# Patient Record
Sex: MALE | Race: OTHER | HISPANIC OR LATINO | URBAN - METROPOLITAN AREA
[De-identification: names, ages, dates, MRNs, and addresses within clinical notes are randomized per-mention and may not be internally consistent; named-entity substitution may affect disease eponyms.]

---

## 2018-11-16 ENCOUNTER — EMERGENCY (EMERGENCY)
Facility: HOSPITAL | Age: 53
LOS: 1 days | Discharge: HOME | End: 2018-11-16
Attending: EMERGENCY MEDICINE
Payer: COMMERCIAL

## 2018-11-16 VITALS
RESPIRATION RATE: 20 BRPM | OXYGEN SATURATION: 98 % | WEIGHT: 171.08 LBS | DIASTOLIC BLOOD PRESSURE: 84 MMHG | HEIGHT: 70 IN | SYSTOLIC BLOOD PRESSURE: 126 MMHG | TEMPERATURE: 99 F | HEART RATE: 86 BPM

## 2018-11-16 LAB
ALBUMIN SERPL ELPH-MCNC: 4.3 G/DL — SIGNIFICANT CHANGE UP (ref 3.3–5.2)
ALP SERPL-CCNC: 75 U/L — SIGNIFICANT CHANGE UP (ref 40–120)
ALT FLD-CCNC: 29 U/L — SIGNIFICANT CHANGE UP
ANION GAP SERPL CALC-SCNC: 13 MMOL/L — SIGNIFICANT CHANGE UP (ref 5–17)
APTT BLD: 31.2 SEC — SIGNIFICANT CHANGE UP (ref 27.5–36.3)
AST SERPL-CCNC: 22 U/L — SIGNIFICANT CHANGE UP
BASOPHILS # BLD AUTO: 0 K/UL — SIGNIFICANT CHANGE UP (ref 0–0.2)
BASOPHILS NFR BLD AUTO: 0.2 % — SIGNIFICANT CHANGE UP (ref 0–2)
BILIRUB SERPL-MCNC: 0.3 MG/DL — LOW (ref 0.4–2)
BUN SERPL-MCNC: 12 MG/DL — SIGNIFICANT CHANGE UP (ref 8–20)
CALCIUM SERPL-MCNC: 9.2 MG/DL — SIGNIFICANT CHANGE UP (ref 8.6–10.2)
CHLORIDE SERPL-SCNC: 107 MMOL/L — SIGNIFICANT CHANGE UP (ref 98–107)
CK MB CFR SERPL CALC: 3 NG/ML — SIGNIFICANT CHANGE UP (ref 0–6.7)
CK SERPL-CCNC: 210 U/L — HIGH (ref 30–200)
CO2 SERPL-SCNC: 25 MMOL/L — SIGNIFICANT CHANGE UP (ref 22–29)
CREAT SERPL-MCNC: 1.13 MG/DL — SIGNIFICANT CHANGE UP (ref 0.5–1.3)
EOSINOPHIL # BLD AUTO: 0.1 K/UL — SIGNIFICANT CHANGE UP (ref 0–0.5)
EOSINOPHIL NFR BLD AUTO: 1.5 % — SIGNIFICANT CHANGE UP (ref 0–5)
GLUCOSE SERPL-MCNC: 87 MG/DL — SIGNIFICANT CHANGE UP (ref 70–115)
HCT VFR BLD CALC: 46.1 % — SIGNIFICANT CHANGE UP (ref 42–52)
HGB BLD-MCNC: 16 G/DL — SIGNIFICANT CHANGE UP (ref 14–18)
INR BLD: 0.95 RATIO — SIGNIFICANT CHANGE UP (ref 0.88–1.16)
LYMPHOCYTES # BLD AUTO: 3.3 K/UL — SIGNIFICANT CHANGE UP (ref 1–4.8)
LYMPHOCYTES # BLD AUTO: 37.6 % — SIGNIFICANT CHANGE UP (ref 20–55)
MCHC RBC-ENTMCNC: 30.5 PG — SIGNIFICANT CHANGE UP (ref 27–31)
MCHC RBC-ENTMCNC: 34.7 G/DL — SIGNIFICANT CHANGE UP (ref 32–36)
MCV RBC AUTO: 87.8 FL — SIGNIFICANT CHANGE UP (ref 80–94)
MONOCYTES # BLD AUTO: 0.6 K/UL — SIGNIFICANT CHANGE UP (ref 0–0.8)
MONOCYTES NFR BLD AUTO: 7.3 % — SIGNIFICANT CHANGE UP (ref 3–10)
NEUTROPHILS # BLD AUTO: 4.6 K/UL — SIGNIFICANT CHANGE UP (ref 1.8–8)
NEUTROPHILS NFR BLD AUTO: 53.2 % — SIGNIFICANT CHANGE UP (ref 37–73)
NT-PROBNP SERPL-SCNC: <5 PG/ML — SIGNIFICANT CHANGE UP (ref 0–300)
PLATELET # BLD AUTO: 146 K/UL — LOW (ref 150–400)
POTASSIUM SERPL-MCNC: 4.4 MMOL/L — SIGNIFICANT CHANGE UP (ref 3.5–5.3)
POTASSIUM SERPL-SCNC: 4.4 MMOL/L — SIGNIFICANT CHANGE UP (ref 3.5–5.3)
PROT SERPL-MCNC: 7.1 G/DL — SIGNIFICANT CHANGE UP (ref 6.6–8.7)
PROTHROM AB SERPL-ACNC: 10.9 SEC — SIGNIFICANT CHANGE UP (ref 10–12.9)
RBC # BLD: 5.25 M/UL — SIGNIFICANT CHANGE UP (ref 4.6–6.2)
RBC # FLD: 13.3 % — SIGNIFICANT CHANGE UP (ref 11–15.6)
SODIUM SERPL-SCNC: 145 MMOL/L — SIGNIFICANT CHANGE UP (ref 135–145)
TROPONIN T SERPL-MCNC: <0.01 NG/ML — SIGNIFICANT CHANGE UP (ref 0–0.06)
WBC # BLD: 8.7 K/UL — SIGNIFICANT CHANGE UP (ref 4.8–10.8)
WBC # FLD AUTO: 8.7 K/UL — SIGNIFICANT CHANGE UP (ref 4.8–10.8)

## 2018-11-16 PROCEDURE — 71045 X-RAY EXAM CHEST 1 VIEW: CPT | Mod: 26

## 2018-11-16 PROCEDURE — 93010 ELECTROCARDIOGRAM REPORT: CPT

## 2018-11-16 PROCEDURE — 99285 EMERGENCY DEPT VISIT HI MDM: CPT

## 2018-11-16 NOTE — ED PROVIDER NOTE - OBJECTIVE STATEMENT
52 y/o M presents to ED c/o intermittent R sided chest pain described as sharp and pressure like onset over 1 week ago. Associated dyspnea on exertion. Last episode of pain was this afternoon and lasted 2-3 hours but resolved. Currently in the ED he is asymptomatic. Took an Aspirin earlier today, but does not take any prescribed medications daily. Denies fevers, chills, cough, abdominal pain, nausea, vomiting, drug use, smoking or EtOH use. No further acute complaints at this time. 54 y/o M presents to ED c/o intermittent R sided chest pain described as sharp and pressure like onset over 1 week ago. Associated dyspnea on exertion. Last episode of pain was this afternoon and lasted 2-3 hours but resolved. Currently in the ED he is asymptomatic. Took an Aspirin earlier today, but does not take any prescribed medications daily. Denies fevers, chills, cough, back pain, diaphoresis, abdominal pain, nausea, vomiting, drug use, smoking or EtOH use. No further acute complaints at this time. 52 y/o M presents to ED c/o intermittent R sided chest pain described as sharp and pressure like onset over 1 week ago. Associated dyspnea on exertion. Last episode of pain was this afternoon and lasted 2-3 hours but resolved. Currently in the ED he is asymptomatic. Took an Aspirin earlier today, but does not take any prescribed medications daily. Denies fevers, chills, cough, back pain, diaphoresis, abdominal pain, nausea, vomiting, drug use, smoking or EtOH use. No further acute complaints at this time.    PMD: Dr. Miri Serrano

## 2018-11-16 NOTE — ED STATDOCS - PROGRESS NOTE DETAILS
54 y/o M pt with hx of throat surgery as child presents to ED c/o right sided chest pain and SOB. Pain is describes as sharp pressure. Pt also has decreased exercise tolerance. He is not on any medications. Denies smoking.   Protocol orders have been entered following a focused evaluation in intake. Pt to be placed in the main ED for further evaluation by another provider.

## 2018-11-16 NOTE — ED PROVIDER NOTE - MEDICAL DECISION MAKING DETAILS
Will check labs, EKG, d-dimer and re-evaluate. Will check labs, EKG and d-dimer, obtain cardiology consult and re-evaluate.

## 2018-11-16 NOTE — ED ADULT NURSE NOTE - NSIMPLEMENTINTERV_GEN_ALL_ED
Implemented All Universal Safety Interventions:  [place] to call system. Call bell, personal items and telephone within reach. Instruct patient to call for assistance. Room bathroom lighting operational. Non-slip footwear when patient is off stretcher. Physically safe environment: no spills, clutter or unnecessary equipment. Stretcher in lowest position, wheels locked, appropriate side rails in place.

## 2018-11-16 NOTE — ED ADULT NURSE NOTE - CHPI ED NUR SYMPTOMS NEG
no headache/no cough/no hemoptysis/no chills/no diaphoresis/no body aches/no wheezing/no edema/no fever

## 2018-11-16 NOTE — ED ADULT NURSE NOTE - OBJECTIVE STATEMENT
patient states for past 2 weeks has been having mild SOB when ambulating with on and off right sided chest pain, denies N/V/cough, patient resting comfortably

## 2018-11-16 NOTE — ED PROVIDER NOTE - NS_ ATTENDINGSCRIBEDETAILS _ED_A_ED_FT
I, Arie Fox, performed the initial face to face bedside interview with this patient regarding history of present illness, review of symptoms and relevant past medical, social and family history.  I completed an independent physical examination.    The history, relevant review of systems, past medical and surgical history, medical decision making, and physical examination was documented by the scribe in my presence and I attest to the accuracy of the documentation.

## 2018-11-16 NOTE — ED ADULT TRIAGE NOTE - WEIGHT IN LBS
171 Consent: Written consent was obtained and risks were reviewed including but not limited to scarring, infection, bleeding, scabbing, incomplete removal, nerve damage and allergy to anesthesia.

## 2018-11-17 VITALS
RESPIRATION RATE: 20 BRPM | TEMPERATURE: 98 F | SYSTOLIC BLOOD PRESSURE: 133 MMHG | HEART RATE: 85 BPM | OXYGEN SATURATION: 98 % | DIASTOLIC BLOOD PRESSURE: 82 MMHG

## 2018-11-17 LAB — TROPONIN T SERPL-MCNC: <0.01 NG/ML — SIGNIFICANT CHANGE UP (ref 0–0.06)

## 2018-11-17 PROCEDURE — 82553 CREATINE MB FRACTION: CPT

## 2018-11-17 PROCEDURE — 82550 ASSAY OF CK (CPK): CPT

## 2018-11-17 PROCEDURE — 93005 ELECTROCARDIOGRAM TRACING: CPT

## 2018-11-17 PROCEDURE — 84484 ASSAY OF TROPONIN QUANT: CPT

## 2018-11-17 PROCEDURE — 83880 ASSAY OF NATRIURETIC PEPTIDE: CPT

## 2018-11-17 PROCEDURE — 36415 COLL VENOUS BLD VENIPUNCTURE: CPT

## 2018-11-17 PROCEDURE — 85027 COMPLETE CBC AUTOMATED: CPT

## 2018-11-17 PROCEDURE — T1013: CPT

## 2018-11-17 PROCEDURE — 71045 X-RAY EXAM CHEST 1 VIEW: CPT

## 2018-11-17 PROCEDURE — 99284 EMERGENCY DEPT VISIT MOD MDM: CPT | Mod: 25

## 2018-11-17 PROCEDURE — 71275 CT ANGIOGRAPHY CHEST: CPT

## 2018-11-17 PROCEDURE — 85610 PROTHROMBIN TIME: CPT

## 2018-11-17 PROCEDURE — 71275 CT ANGIOGRAPHY CHEST: CPT | Mod: 26

## 2018-11-17 PROCEDURE — 80053 COMPREHEN METABOLIC PANEL: CPT

## 2018-11-17 PROCEDURE — 85379 FIBRIN DEGRADATION QUANT: CPT

## 2018-11-17 PROCEDURE — 85730 THROMBOPLASTIN TIME PARTIAL: CPT
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
